# Patient Record
Sex: MALE | Race: BLACK OR AFRICAN AMERICAN | Employment: FULL TIME | URBAN - NONMETROPOLITAN AREA
[De-identification: names, ages, dates, MRNs, and addresses within clinical notes are randomized per-mention and may not be internally consistent; named-entity substitution may affect disease eponyms.]

---

## 2021-07-29 ENCOUNTER — TRANSCRIBE ORDER (OUTPATIENT)
Dept: REGISTRATION | Age: 46
End: 2021-07-29

## 2021-07-29 DIAGNOSIS — M76.829 PTTD (POSTERIOR TIBIAL TENDON DYSFUNCTION): Primary | ICD-10-CM

## 2021-08-26 ENCOUNTER — OFFICE VISIT (OUTPATIENT)
Dept: INTERNAL MEDICINE CLINIC | Age: 46
End: 2021-08-26
Payer: COMMERCIAL

## 2021-08-26 VITALS
RESPIRATION RATE: 18 BRPM | TEMPERATURE: 97.4 F | HEIGHT: 74 IN | OXYGEN SATURATION: 97 % | DIASTOLIC BLOOD PRESSURE: 89 MMHG | WEIGHT: 277.6 LBS | BODY MASS INDEX: 35.63 KG/M2 | SYSTOLIC BLOOD PRESSURE: 138 MMHG | HEART RATE: 82 BPM

## 2021-08-26 DIAGNOSIS — M25.531 RIGHT WRIST PAIN: Primary | ICD-10-CM

## 2021-08-26 DIAGNOSIS — R20.2 PARESTHESIA: ICD-10-CM

## 2021-08-26 DIAGNOSIS — M25.532 LEFT WRIST PAIN: ICD-10-CM

## 2021-08-26 PROCEDURE — 99213 OFFICE O/P EST LOW 20 MIN: CPT | Performed by: INTERNAL MEDICINE

## 2021-08-26 NOTE — PROGRESS NOTES
Left hand/fingers numbness comes and goes for a few years. Patient only had 1st covid vaccine. Refuses 2nd dose. Kaitlyn Mortensen presents today for   Chief Complaint   Patient presents with    Numbness     left hand       Is someone accompanying this pt? no  Is the patient using any DME equipment during OV? no    Depression Screening:  3 most recent PHQ Screens 8/26/2021   Little interest or pleasure in doing things Not at all   Feeling down, depressed, irritable, or hopeless Not at all   Total Score PHQ 2 0       Learning Assessment:  No flowsheet data found. Health Maintenance reviewed and discussed and ordered per Provider. Health Maintenance Due   Topic Date Due    Hepatitis C Screening  Never done    COVID-19 Vaccine (1) Never done    DTaP/Tdap/Td series (1 - Tdap) Never done    Lipid Screen  Never done    Colorectal Cancer Screening Combo  Never done   . Coordination of Care:  1. Have you been to the ER, urgent care clinic since your last visit? Hospitalized since your last visit? no    2. Have you seen or consulted any other health care providers outside of the [address] [place] [address] since your last visit? Include any pap smears or colon screening.  no

## 2021-08-26 NOTE — PROGRESS NOTES
1. Right wrist pain  The patient reports some pain around the hand and at the base of his wrist.  He could have some osteoarthritis. We will check an x-ray of his  - Xray left wrist    2. Paresthesia  Alternatively he may have some peripheral nerve injury. Will order an EMG. The focus of this EMG will be his left upper extremity  - EMG TWO EXTREMITIES UPPER; Future      Chief Complaint   Patient presents with    Numbness     left hand        HPI   This is a very pleasant 65-year-old gentleman who presents today complaining of 2 years of left-sided wrist pain. He reports it only bothers him when it really gets cold. Sometimes though he will feel shooting sensations and a sense of pins-and-needles down all of his fingers. He denies any injury to his wrist he denies any left-sided shoulder injury. He has no other complaints at all. It is worse in winter and like I said worse when it is cold. Currently here in my office he does not feel it. He has no other complaints  There is no problem list on file for this patient. No current outpatient medications on file prior to visit. No current facility-administered medications on file prior to visit. ROS  - + parathesia left hand    Visit Vitals  /89   Pulse 82   Temp 97.4 °F (36.3 °C)   Resp 18   Ht 6' 2\" (1.88 m)   Wt 277 lb 9.6 oz (125.9 kg)   SpO2 97%   BMI 35.64 kg/m²           Physical Exam  Constitutional:       Appearance: Normal appearance. weight. NAD and pleasant  Neurological:      Intact sensory areas in palmar and ventral distribution\    Brisk cap refill    +5  on left.     No muscle atrophy

## 2021-12-29 ENCOUNTER — TRANSCRIBE ORDER (OUTPATIENT)
Dept: REGISTRATION | Age: 46
End: 2021-12-29

## 2021-12-29 ENCOUNTER — HOSPITAL ENCOUNTER (OUTPATIENT)
Dept: GENERAL RADIOLOGY | Age: 46
Discharge: HOME | End: 2021-12-29
Attending: PODIATRIST
Payer: COMMERCIAL

## 2021-12-29 DIAGNOSIS — M79.672 LEFT FOOT PAIN: ICD-10-CM

## 2021-12-29 DIAGNOSIS — M79.672 LEFT FOOT PAIN: Primary | ICD-10-CM

## 2021-12-29 PROCEDURE — 73630 X-RAY EXAM OF FOOT: CPT

## 2023-10-05 ENCOUNTER — HOSPITAL ENCOUNTER (OUTPATIENT)
Age: 48
Setting detail: RECURRING SERIES
Discharge: HOME | End: 2023-10-08
Payer: MEDICAID

## 2023-10-05 PROCEDURE — 97161 PT EVAL LOW COMPLEX 20 MIN: CPT

## 2023-10-05 NOTE — PLAN OF CARE
[address] [place] [address], [address] [place] [address], [address] [place] [address] / [id number] [facility]  Patient Name: Bandar Dozier : 1975   Medical   Diagnosis: Radiculopathy, cervical region [M54.12] Treatment Diagnosis: Radiculopathy, cervical region [M54.12]   Onset Date: 2020     Referral Source: LAQUITA [facility] [place]): 10/5/2023   Prior Hospitalization: See medical history Provider #: [id number]   Prior Level of Function: (I) no limitations   Comorbidities: Hx of R RCR   Medications: Verified on Patient Summary List   The Plan of Care and following information is based on the information from the initial evaluation.   ==========================================================================================  Assessment / Functional Analysis:    Pt is a 50 y/o M referred to P.T. for cervicalgia with c/o numbness in his L hand with sensitivity to cold also reported. He reports feeling popping and clicking in his neck with all motions but flexion. 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       ==========================================================================================  Eval Complexity: History: LOW Complexity : Zero comorbidities / personal factors that will impact the outcome / POCExam:LOW Complexity : 1-2 Standardized tests and measures addressing body structure, function, activity limitation and / or participation in recreation  Presentation: LOW Complexity : Stable, uncomplicated  Clinical Decision Making:LOW Complexity : FOTO score of 75-100Overall Complexity:LOW     Problem List: decrease flexibility/ joint mobility   Treatment Plan may include any combination of the following: Theraputic Exercise, Teaching of a HEP,

## 2023-10-05 NOTE — THERAPY EVALUATION
PT CERVICAL EVAL & DAILY [id number]    Patient Name: David Valadez  AFDF:  : 1975  [x]  Patient  Verified  In time:1109  Out time:1208  Total Treatment Time (min): 61  Visit #: 1     Treatment Area: Radiculopathy, cervical region [M54.12]    SUBJECTIVE  Pt is a 50 y/o M referred to P.T. for cervicalgia with c/o numbness in his L hand with sensitivity to cold also reported. He reports feeling popping and clicking in his neck with all motions but flexion. 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 Patient Goals: To reduce numbness in hand. Pain Level (0-10 scale): 0/10  []Sharp  []Dull  []Achy []Burning []Throbbing []N&T []Other:  []constant []intermittent []improving []worsening []no change since onset    Symptoms  Aggravated by:   [] Bending [] Sitting [] Standing [] Reaching Overhead   [] Moving [] Cough [] Sneeze [] Eating   [] AM  [] PM  Lying:  [] sup   [] pro   [] sidelying   [] Other:     Eased by:    [] Bending [] Sitting [] Standing Lying: [] sup  [] pro  [] sidelying   [] Moving [] AM  [] PM  [] Other:     General Health:  Red Flags Indicated? [] Yes    [x] No  [] Yes [] No Recent weight change (If yes, due to dieting? [] Yes  [] No)   [] Yes [] No Persistent cough  [] Yes [] No Unremitting pain at night  [] Yes [] No Dizziness  [] Yes [] No Blurred vision  [] Yes [] No Hands more cold or painful in cold weather  [] Yes [] No Ringing in ears  [] Yes [] No Difficulty swallowing  [] Yes [] No Dysfunction of bowel or bladder  [] Yes [] No Recent illness within past 3 weeks (i.e, cold, flu)  [] Yes [] No Jaw pain    Past History/Treatments: No EDSON, takes naproxen at times.      Diagnostic Tests: [] Lab work [x] X-rays    [] CT [] MRI     [] Other:  Results: Per pt, significant arthritis, waiting for MRI    Headaches: